# Patient Record
Sex: FEMALE | Race: WHITE | NOT HISPANIC OR LATINO | Employment: FULL TIME | URBAN - NONMETROPOLITAN AREA
[De-identification: names, ages, dates, MRNs, and addresses within clinical notes are randomized per-mention and may not be internally consistent; named-entity substitution may affect disease eponyms.]

---

## 2023-03-06 ENCOUNTER — OFFICE VISIT (OUTPATIENT)
Dept: OBSTETRICS AND GYNECOLOGY | Facility: CLINIC | Age: 23
End: 2023-03-06
Payer: COMMERCIAL

## 2023-03-06 VITALS
DIASTOLIC BLOOD PRESSURE: 78 MMHG | HEIGHT: 66 IN | SYSTOLIC BLOOD PRESSURE: 112 MMHG | BODY MASS INDEX: 32.62 KG/M2 | WEIGHT: 203 LBS

## 2023-03-06 DIAGNOSIS — Z30.8 ENCOUNTER FOR OTHER CONTRACEPTIVE MANAGEMENT: ICD-10-CM

## 2023-03-06 DIAGNOSIS — Z01.419 ENCOUNTER FOR WELL WOMAN EXAM WITH ROUTINE GYNECOLOGICAL EXAM: Primary | ICD-10-CM

## 2023-03-06 PROCEDURE — G0123 SCREEN CERV/VAG THIN LAYER: HCPCS | Performed by: NURSE PRACTITIONER

## 2023-03-06 PROCEDURE — 99385 PREV VISIT NEW AGE 18-39: CPT | Performed by: NURSE PRACTITIONER

## 2023-03-06 PROCEDURE — 99213 OFFICE O/P EST LOW 20 MIN: CPT | Performed by: NURSE PRACTITIONER

## 2023-03-06 RX ORDER — LEVONORGESTREL 52 MG/1
1 INTRAUTERINE DEVICE INTRAUTERINE ONCE
Qty: 1 EACH | Refills: 0 | Status: SHIPPED | OUTPATIENT
Start: 2023-03-06 | End: 2023-03-10

## 2023-03-06 NOTE — PROGRESS NOTES
"Chief Complaint   Patient presents with   • Contraception     Patient is new to our office and here to establish GYN care.  She is due for a well GYN exam at this time.  She has never had a GYN exam on pap performed She wishes to discuss OCP options.  She denies pelvic pain, abnormal uterine bleeding or discharge, and voices no other complaints or concerns at this time.   • Gynecologic Exam       History:  Joceline Mcfarland is a 23 y.o. female who presents today for follow-up for evaluation of the above:    HPI      Joceline Mcfarland is a 23 y.o. female ,  who comes to the office today for annual GYN examination. Last menstrual period was 02/23/2023.  This will be her first Pap smear.  She is currently not on hormonal contraception. Her medical history is reviewed.   Periods very painful and heavy for the past year.             ROS:  Review of Systems   Constitutional: Negative for fatigue and unexpected weight change.   HENT: Negative.    Eyes: Negative.    Respiratory: Negative.    Cardiovascular: Negative.    Gastrointestinal: Negative for abdominal pain, constipation and diarrhea.   Endocrine: Negative.    Genitourinary: Positive for menstrual problem. Negative for difficulty urinating, dyspareunia, genital sores, pelvic pain, vaginal bleeding, vaginal discharge and vaginal pain.   Musculoskeletal: Negative.    Skin: Negative.    Neurological: Negative.    Psychiatric/Behavioral: Negative.        Ms. Mcfarland  reports that she has never smoked. She has never used smokeless tobacco. She reports current alcohol use of about 1.0 standard drink per week. She reports that she does not use drugs.      Current Outpatient Medications:   •  Levonorgestrel (Mirena, 52 MG,) 20 MCG/DAY intrauterine device IUD, 1 each by Intrauterine route 1 (One) Time for 1 dose., Disp: 1 each, Rfl: 0      OBJECTIVE:  /78   Ht 167.6 cm (66\")   Wt 92.1 kg (203 lb)   LMP 02/23/2023 (Exact Date)   BMI 32.77 kg/m²    Physical Exam  Exam " conducted with a chaperone present.   Constitutional:       Appearance: She is well-developed.   HENT:      Head: Normocephalic and atraumatic.   Eyes:      General: Lids are normal.      Conjunctiva/sclera: Conjunctivae normal.      Pupils: Pupils are equal, round, and reactive to light.   Neck:      Thyroid: No thyromegaly.   Cardiovascular:      Rate and Rhythm: Normal rate and regular rhythm.      Heart sounds: Normal heart sounds.   Pulmonary:      Effort: Pulmonary effort is normal.      Breath sounds: Normal breath sounds.   Chest:   Breasts:     Breasts are symmetrical.      Right: No inverted nipple, mass, nipple discharge, skin change or tenderness.      Left: No inverted nipple, mass, nipple discharge, skin change or tenderness.   Abdominal:      General: Bowel sounds are normal.      Palpations: Abdomen is soft.   Genitourinary:     Exam position: Supine.      Labia:         Right: No rash, tenderness, lesion or injury.         Left: No rash, tenderness, lesion or injury.       Vagina: No signs of injury and foreign body. No vaginal discharge, erythema, tenderness or bleeding.      Cervix: No cervical motion tenderness, discharge or friability.      Uterus: Not deviated, not enlarged, not fixed and not tender.       Adnexa:         Right: No mass, tenderness or fullness.          Left: No mass, tenderness or fullness.        Rectum: Normal. No tenderness or external hemorrhoid.   Musculoskeletal:         General: Normal range of motion.      Cervical back: Normal range of motion and neck supple.   Skin:     General: Skin is warm and dry.   Neurological:      Mental Status: She is alert and oriented to person, place, and time.         Assessment/Plan    Diagnoses and all orders for this visit:    1. Encounter for well woman exam with routine gynecological exam (Primary)  -     Liquid-based Pap Smear, Screening  Immunizations:      - Tetanus: Unknown or >10 years ago. Recommend to have at pharmacy or on  injury.      - Influenza: recommended annually      - Pneumovax:once after age 65      - Prevnar: Once after age 65      - Zostavax: Once after age 60  Colon Cancer Screening: Due at 45  Mammogram: Due at 40.  PAP: Done today  DEXA: DEXA scan at 65  COVID vaccine information is available at vaccine.[place].gov     2. Encounter for other contraceptive management  -     Levonorgestrel (Mirena, 52 MG,) 20 MCG/DAY intrauterine device IUD; 1 each by Intrauterine route 1 (One) Time for 1 dose.  Dispense: 1 each; Refill: 0      We will notify her when the Pap smear results are available.  She will return for IUD insertion.  We did discuss that she will be tested for pregnancy and infection at the time of her IUD insertion.  She is recommended to take 800 mg of ibuprofen approximately 30 minutes before her office visit for insertion. In the meantime if she develops questions or problems, she will notify the office.         An After Visit Summary was printed and given to the patient at discharge.  Return in about 1 year (around 3/6/2024) for Annual physical. Sooner if problems arise.          Jacquie Snowden APRN. 3/6/2023   Electronically Signed

## 2023-03-07 LAB
GEN CATEG CVX/VAG CYTO-IMP: NORMAL
LAB AP CASE REPORT: NORMAL
LAB AP GYN ADDITIONAL INFORMATION: NORMAL
Lab: NORMAL
PATH INTERP SPEC-IMP: NORMAL
STAT OF ADQ CVX/VAG CYTO-IMP: NORMAL

## 2023-03-31 ENCOUNTER — PROCEDURE VISIT (OUTPATIENT)
Dept: OBSTETRICS AND GYNECOLOGY | Facility: CLINIC | Age: 23
End: 2023-03-31
Payer: COMMERCIAL

## 2023-03-31 VITALS
HEIGHT: 66 IN | WEIGHT: 202 LBS | SYSTOLIC BLOOD PRESSURE: 116 MMHG | BODY MASS INDEX: 32.47 KG/M2 | DIASTOLIC BLOOD PRESSURE: 78 MMHG

## 2023-03-31 DIAGNOSIS — Z30.430 ENCOUNTER FOR INITIAL INSERTION OF INTRAUTERINE CONTRACEPTIVE DEVICE: Primary | ICD-10-CM

## 2023-03-31 LAB
B-HCG UR QL: NEGATIVE
EXPIRATION DATE: NORMAL
INTERNAL NEGATIVE CONTROL: NEGATIVE
INTERNAL POSITIVE CONTROL: POSITIVE
Lab: NORMAL

## 2023-03-31 RX ORDER — BROMPHENIRAMINE MALEATE, DEXTROMETHORPHAN HYDROBROMIDE, PHENYLEPHRINE HYDROCHLORIDE 1; 5; 2.5 MG/5ML; MG/5ML; MG/5ML
LIQUID ORAL
COMMUNITY
Start: 2023-03-30

## 2023-03-31 RX ORDER — IPRATROPIUM BROMIDE 21 UG/1
SPRAY, METERED NASAL
COMMUNITY
Start: 2023-03-30

## 2023-03-31 NOTE — PROGRESS NOTES
"Chief Complaint   Patient presents with   • Procedure     Patient here for a patient owned Mirena IUD insertion, Lot [id number], Exp 04/2025. Patient denies questions or concerns. UPT negative     Joceline Mcfarland is a 23 y.o. female  is here for IUD insertion.  Her last Pap smear was 03/06/2023 and normal.  She has no history of cervical dysplasia.    /78   Ht 167.6 cm (66\")   Wt 91.6 kg (202 lb)   BMI 32.60 kg/m²      A urine pregnancy test in the office today is negative.    We have discussed the IUD, common side effects, and potential adverse events like uterine perforation, infection, or expulsion.  She has signed the consent form after answering her questions.    MIRENA IUD lot number [id number] was placed today.  The cervix was visualized and a sample was obtained for gonorrhea and chlamydia testing. The cervix was then cleansed with BETADINE swabs.  The anterior lip was grasped with a single-tooth tenaculum.  The uterus sounded to 7 cm.  The IUD was placed at the uterine fundus using sterile technique in a standard fashion.  The applicator was removed and the strings trimmed to 3 cm length.  The tenaculum site was made hemostatic with silver nitrate sticks. She tolerated the procedure well.    Assessment: IUD placement.    Joceline Mcfarland will return in one month for an IUD check.  She is given instructions to call if she has any fevers, heavy bleeding, severe pain, or nausea.    "

## 2023-04-03 LAB
C TRACH RRNA SPEC QL NAA+PROBE: NEGATIVE
N GONORRHOEA RRNA SPEC QL NAA+PROBE: NEGATIVE

## 2023-05-03 ENCOUNTER — OFFICE VISIT (OUTPATIENT)
Dept: OBSTETRICS AND GYNECOLOGY | Facility: CLINIC | Age: 23
End: 2023-05-03
Payer: COMMERCIAL

## 2023-05-03 VITALS
WEIGHT: 202 LBS | HEIGHT: 66 IN | BODY MASS INDEX: 32.47 KG/M2 | SYSTOLIC BLOOD PRESSURE: 112 MMHG | DIASTOLIC BLOOD PRESSURE: 74 MMHG

## 2023-05-03 DIAGNOSIS — Z30.431 IUD CHECK UP: Primary | ICD-10-CM

## 2023-05-03 NOTE — PROGRESS NOTES
"Chief Complaint   Patient presents with   • Contraception     Patient is here for Mirena IUD check.  IUD placed 3/31/23.  TVUS in office today shows IUD in normal placement.  She stopped bleeding a couple of days ago she states.  Patient voices no complaints at this time.       History:  Joceline Mcfarland is a 23 y.o. female who presents today for follow-up for evaluation of the above:    HPI     Patient presents today for IUD check today. She reports she has been feeling well without acute illness. She has stopped bleeding.            ROS:  Review of Systems   Constitutional: Negative for fatigue and unexpected weight change.   HENT: Negative.    Eyes: Negative.    Respiratory: Negative.    Cardiovascular: Negative.    Gastrointestinal: Negative for abdominal pain, constipation and diarrhea.   Endocrine: Negative.    Genitourinary: Negative for difficulty urinating, dyspareunia, genital sores, menstrual problem, pelvic pain, vaginal bleeding, vaginal discharge and vaginal pain.   Musculoskeletal: Negative.    Skin: Negative.    Neurological: Negative.    Psychiatric/Behavioral: Negative.        Ms. Mcfarland  reports that she has never smoked. She has never used smokeless tobacco. She reports current alcohol use of about 1.0 standard drink per week. She reports that she does not use drugs.      Current Outpatient Medications:   •  ipratropium (ATROVENT) 0.03 % nasal spray, , Disp: , Rfl:   •  Rynex DM 2.5-1-5 MG/5ML liquid, , Disp: , Rfl:   •  Levonorgestrel (Mirena, 52 MG,) 20 MCG/DAY intrauterine device IUD, 1 each by Intrauterine route 1 (One) Time for 1 dose., Disp: 1 each, Rfl: 0      OBJECTIVE:  /74   Ht 167.6 cm (66\")   Wt 91.6 kg (202 lb)   BMI 32.60 kg/m²    Physical Exam  Vitals reviewed.   Constitutional:       Appearance: She is well-developed.   HENT:      Head: Normocephalic and atraumatic.   Eyes:      Pupils: Pupils are equal, round, and reactive to light.   Cardiovascular:      Rate and " Rhythm: Normal rate and regular rhythm.      Heart sounds: Normal heart sounds.   Pulmonary:      Effort: Pulmonary effort is normal.      Breath sounds: Normal breath sounds.   Musculoskeletal:         General: Normal range of motion.      Cervical back: Normal range of motion and neck supple.   Skin:     General: Skin is warm and dry.   Neurological:      Mental Status: She is alert and oriented to person, place, and time.         Assessment/Plan    Diagnoses and all orders for this visit:    1. IUD check up (Primary)    US is reviewed and IUD is in correct position.        An After Visit Summary was printed and given to the patient at discharge.  Return in about 10 months (around 3/3/2024) for Annual physical. Sooner if problems arise.          Jacquie Snowden APRN. 5/3/2023   Electronically Signed